# Patient Record
Sex: MALE | Race: WHITE | NOT HISPANIC OR LATINO | Employment: UNEMPLOYED | URBAN - NONMETROPOLITAN AREA
[De-identification: names, ages, dates, MRNs, and addresses within clinical notes are randomized per-mention and may not be internally consistent; named-entity substitution may affect disease eponyms.]

---
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
## 2020-10-26 DIAGNOSIS — M25.562 ACUTE PAIN OF LEFT KNEE: Primary | ICD-10-CM

## 2020-12-09 ENCOUNTER — OFFICE VISIT (OUTPATIENT)
Dept: ORTHOPEDIC SURGERY | Facility: CLINIC | Age: 20
End: 2020-12-09

## 2020-12-09 VITALS
DIASTOLIC BLOOD PRESSURE: 70 MMHG | BODY MASS INDEX: 29.8 KG/M2 | TEMPERATURE: 97.7 F | SYSTOLIC BLOOD PRESSURE: 120 MMHG | HEART RATE: 60 BPM | WEIGHT: 220 LBS | HEIGHT: 72 IN | OXYGEN SATURATION: 99 %

## 2020-12-09 DIAGNOSIS — M25.562 ACUTE PAIN OF LEFT KNEE: Primary | ICD-10-CM

## 2020-12-09 PROCEDURE — 99203 OFFICE O/P NEW LOW 30 MIN: CPT | Performed by: ORTHOPAEDIC SURGERY

## 2020-12-09 NOTE — PROGRESS NOTES
"Abel Jeffrey is a 20 y.o. male   Primary provider:  Pinky Gonzalez MD       Chief Complaint   Patient presents with   • Left Knee - Initial Evaluation, Pain       HISTORY OF PRESENT ILLNESS: Patient is being seen for his left knee. Pain level of     This is the first office visit for evaluation of left knee function.    Mr. Jeffrey is 20 years old.  He sustained an injury to his left knee in 2017 and underwent anterior cruciate ligament reconstruction in December 2017.  There were no complications.  He says he has had only one surgery on the knee.  He has returned to unrestricted activities.  He denies any pain swelling or giving of the knee.  He is anticipating joining the [employer] and comes today primarily to obtain medical clearance with regard to his knee.    He takes no medications on a regular basis and has no allergies.  He does not smoke.  His general health is good.    Dr. Gonzalez has asked that I see him for evaluation and treatment.    Pain  Episode onset: 2017.        CONCURRENT MEDICAL HISTORY:    No past medical history on file.    No Known Allergies    No current outpatient medications on file.    Past Surgical History:   Procedure Laterality Date   • KNEE ARTHROSCOPY W/ ACL RECONSTRUCTION AND PATELLA GRAFT Left 12/07/2017       No family history on file.    Social History     Socioeconomic History   • Marital status: Single     Spouse name: Not on file   • Number of children: Not on file   • Years of education: Not on file   • Highest education level: Not on file   Tobacco Use   • Smoking status: Never Smoker   • Smokeless tobacco: Never Used   Substance and Sexual Activity   • Alcohol use: Yes     Comment: \"hardly ever\"   • Drug use: No   • Sexual activity: Defer        Review of Systems   Constitutional: Negative.    HENT: Negative.    Eyes: Negative.    Respiratory: Negative.    Cardiovascular: Negative.    Gastrointestinal: Negative.    Endocrine: Negative.    Genitourinary: Negative.  " "  Musculoskeletal: Negative.    Skin: Negative.    Allergic/Immunologic: Negative.    Neurological: Negative.    Hematological: Negative.    Psychiatric/Behavioral: Negative.      Review of systems is positive as noted above.  PHYSICAL EXAMINATION:       Vitals:    12/09/20 1453   BP: 120/70   BP Location: Left arm   Patient Position: Sitting   Cuff Size: Adult   Pulse: 60   Temp: 97.7 °F (36.5 °C)   TempSrc: Temporal   SpO2: 99%   Weight: 99.8 kg (220 lb)   Height: 182.9 cm (72\")   PainSc:   1       Physical Exam he is alert healthy-appearing and in no apparent distress.  He responds appropriately to questions and commands.    GAIT:     [x]  Normal  []  Antalgic    Assistive device: [x]  None  []  Walker     []  Crutches  []  Cane     []  Wheelchair  []  Stretcher    Ortho Exam he walks with a normal gait.  Alignment of lower extremities is unremarkable.  He is able do a full squat with no apparent pain.  There is no effusion of the left knee.  Motion of the knee is smooth from 0 to 120 degrees.  He has less than 5 degrees of hyperextension of the right knee.  There is trace laxity on valgus stressing symmetrically.  Lachman testing is 1+ on the left similar to that on the right.  Pivot shift is negative on the left.  There is a 7 and half centimeter scar over the anterior aspect of the knee.  The left calf measures 39-1/2 cm compared to 39 on the right.  The left thigh measures 56 and half centimeters compared to 55 on the right.  Leg lengths are equal.    Xr Knee 3 View Left    Result Date: 12/9/2020  Ordering Provider:  Maynor Steel MD Ordering Diagnosis/Indication:  Acute pain of left knee Procedure:  XR KNEE 3 VW LEFT Exam Date:  12/9/20 COMPARISON: Exam of the knee dated September 2017      RadioGraphs of left knee AP lateral and notch views done today show postoperative changes consistent with previous anterior cruciate ligament reconstruction.  The position of the bony tunnels is satisfactory.  " There is minimal widening of the tibial tunnel.  There is no joint space narrowing.  The intercondylar notch is capacious. Maynor Steel MD 12/9/20            Xr Knee 3 View Left    Result Date: 12/9/2020  Narrative: Ordering Provider:  Maynor Steel MD Ordering Diagnosis/Indication:  Acute pain of left knee Procedure:  XR KNEE 3 VW LEFT Exam Date:  12/9/20 COMPARISON: Exam of the knee dated September 2017     Impression:  RadioGraphs of left knee AP lateral and notch views done today show postoperative changes consistent with previous anterior cruciate ligament reconstruction.  The position of the bony tunnels is satisfactory.  There is minimal widening of the tibial tunnel.  There is no joint space narrowing.  The intercondylar notch is capacious. Maynor Steel MD 12/9/20          ASSESSMENT: Satisfactory result following anterior cruciate ligament reconstruction.  I have placed no restrictions on his activities and I would support his decision to pursue a  career.    Return here as needed.    Diagnoses and all orders for this visit:    Acute pain of left knee  -     XR Knee 3 View Left; Future          PLAN  Body mass index is 29.84 kg/m².    Return if symptoms worsen or fail to improve.    Maynor Steel MD